# Patient Record
Sex: FEMALE | Race: WHITE | NOT HISPANIC OR LATINO
[De-identification: names, ages, dates, MRNs, and addresses within clinical notes are randomized per-mention and may not be internally consistent; named-entity substitution may affect disease eponyms.]

---

## 2018-02-06 ENCOUNTER — RESULT REVIEW (OUTPATIENT)
Age: 53
End: 2018-02-06

## 2019-01-13 PROBLEM — Z00.00 ENCOUNTER FOR PREVENTIVE HEALTH EXAMINATION: Status: ACTIVE | Noted: 2019-01-13

## 2019-02-06 ENCOUNTER — RECORD ABSTRACTING (OUTPATIENT)
Age: 54
End: 2019-02-06

## 2019-02-06 DIAGNOSIS — K21.9 GASTRO-ESOPHAGEAL REFLUX DISEASE W/OUT ESOPHAGITIS: ICD-10-CM

## 2019-02-06 DIAGNOSIS — Z82.49 FAMILY HISTORY OF ISCHEMIC HEART DISEASE AND OTHER DISEASES OF THE CIRCULATORY SYSTEM: ICD-10-CM

## 2019-02-06 DIAGNOSIS — Z86.39 PERSONAL HISTORY OF OTHER ENDOCRINE, NUTRITIONAL AND METABOLIC DISEASE: ICD-10-CM

## 2019-02-06 LAB — CYTOLOGY CVX/VAG DOC THIN PREP: NORMAL

## 2019-02-06 RX ORDER — BENZONATATE 100 MG/1
CAPSULE ORAL
Refills: 0 | Status: ACTIVE | COMMUNITY

## 2019-02-06 RX ORDER — DEXLANSOPRAZOLE 60 MG/1
60 CAPSULE, DELAYED RELEASE ORAL
Refills: 0 | Status: ACTIVE | COMMUNITY

## 2019-02-06 RX ORDER — RANITIDINE HYDROCHLORIDE 300 MG/1
300 TABLET, FILM COATED ORAL
Refills: 0 | Status: ACTIVE | COMMUNITY

## 2019-05-21 ENCOUNTER — TRANSCRIPTION ENCOUNTER (OUTPATIENT)
Age: 54
End: 2019-05-21

## 2019-05-21 ENCOUNTER — APPOINTMENT (OUTPATIENT)
Dept: OBGYN | Facility: CLINIC | Age: 54
End: 2019-05-21
Payer: COMMERCIAL

## 2019-05-21 VITALS
SYSTOLIC BLOOD PRESSURE: 140 MMHG | WEIGHT: 182 LBS | DIASTOLIC BLOOD PRESSURE: 82 MMHG | BODY MASS INDEX: 34.36 KG/M2 | HEIGHT: 61 IN

## 2019-05-21 DIAGNOSIS — R92.2 INCONCLUSIVE MAMMOGRAM: ICD-10-CM

## 2019-05-21 PROCEDURE — 99396 PREV VISIT EST AGE 40-64: CPT

## 2019-05-28 LAB
CYTOLOGY CVX/VAG DOC THIN PREP: NORMAL
HPV HIGH+LOW RISK DNA PNL CVX: NOT DETECTED

## 2020-07-28 ENCOUNTER — APPOINTMENT (OUTPATIENT)
Dept: NEUROLOGY | Facility: CLINIC | Age: 55
End: 2020-07-28

## 2021-03-18 ENCOUNTER — NON-APPOINTMENT (OUTPATIENT)
Age: 56
End: 2021-03-18

## 2021-04-14 ENCOUNTER — APPOINTMENT (OUTPATIENT)
Dept: OBGYN | Facility: CLINIC | Age: 56
End: 2021-04-14
Payer: COMMERCIAL

## 2021-04-14 ENCOUNTER — NON-APPOINTMENT (OUTPATIENT)
Age: 56
End: 2021-04-14

## 2021-04-14 VITALS
WEIGHT: 183 LBS | DIASTOLIC BLOOD PRESSURE: 76 MMHG | HEIGHT: 61 IN | SYSTOLIC BLOOD PRESSURE: 142 MMHG | BODY MASS INDEX: 34.55 KG/M2

## 2021-04-14 DIAGNOSIS — Z12.31 ENCOUNTER FOR SCREENING MAMMOGRAM FOR MALIGNANT NEOPLASM OF BREAST: ICD-10-CM

## 2021-04-14 DIAGNOSIS — Z01.411 ENCOUNTER FOR GYNECOLOGICAL EXAMINATION (GENERAL) (ROUTINE) WITH ABNORMAL FINDINGS: ICD-10-CM

## 2021-04-14 PROCEDURE — 99072 ADDL SUPL MATRL&STAF TM PHE: CPT

## 2021-04-14 PROCEDURE — 99396 PREV VISIT EST AGE 40-64: CPT

## 2021-04-14 NOTE — HISTORY OF PRESENT ILLNESS
[TextBox_4] : 54yo P0 here for annual exam. No periods since 51yo. No PMB. Sometimes has mild hot flashes. No insomnia, night sweats, etc. She started exercising again- uses [employer]. 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 Not . No children. Lives alone; parents living upstairs from her.  Father had a stroke and she and her mother care for him.

## 2023-10-23 NOTE — REVIEW OF SYSTEMS
[Negative] : Heme/Lymph Melolabial Transposition Flap Text: The defect edges were debeveled with a #15 scalpel blade.  Given the location of the defect and the proximity to free margins a melolabial flap was deemed most appropriate.  Using a sterile surgical marker, an appropriate melolabial transposition flap was drawn incorporating the defect.    The area thus outlined was incised deep to adipose tissue with a #15 scalpel blade.  The skin margins were undermined to an appropriate distance in all directions utilizing iris scissors.

## 2025-04-23 NOTE — COUNSELING
[Breast Self Exam] : breast self exam [Nutrition] : nutrition [Exercise] : exercise Normal vision: sees adequately in most situations; can see medication labels, newsprint